# Patient Record
Sex: MALE | Race: WHITE | NOT HISPANIC OR LATINO | Employment: FULL TIME | URBAN - NONMETROPOLITAN AREA
[De-identification: names, ages, dates, MRNs, and addresses within clinical notes are randomized per-mention and may not be internally consistent; named-entity substitution may affect disease eponyms.]

---

## 2018-12-11 ENCOUNTER — OFFICE VISIT (OUTPATIENT)
Dept: ORTHOPEDIC SURGERY | Facility: CLINIC | Age: 58
End: 2018-12-11

## 2018-12-11 ENCOUNTER — HOSPITAL ENCOUNTER (OUTPATIENT)
Dept: GENERAL RADIOLOGY | Facility: HOSPITAL | Age: 58
Discharge: HOME | End: 2018-12-11
Admitting: ORTHOPAEDIC SURGERY

## 2018-12-11 ENCOUNTER — PREP FOR SURGERY (OUTPATIENT)
Dept: OTHER | Facility: HOSPITAL | Age: 58
End: 2018-12-11

## 2018-12-11 ENCOUNTER — APPOINTMENT (OUTPATIENT)
Dept: PREADMISSION TESTING | Facility: HOSPITAL | Age: 58
End: 2018-12-11

## 2018-12-11 VITALS — RESPIRATION RATE: 18 BRPM | BODY MASS INDEX: 26.7 KG/M2 | WEIGHT: 176.2 LBS | HEIGHT: 68 IN

## 2018-12-11 VITALS
SYSTOLIC BLOOD PRESSURE: 161 MMHG | HEIGHT: 68 IN | HEART RATE: 58 BPM | BODY MASS INDEX: 26.67 KG/M2 | WEIGHT: 176 LBS | DIASTOLIC BLOOD PRESSURE: 101 MMHG | OXYGEN SATURATION: 99 %

## 2018-12-11 DIAGNOSIS — Z01.818 PREOP EXAMINATION: Primary | ICD-10-CM

## 2018-12-11 DIAGNOSIS — S52.531A CLOSED COLLES' FRACTURE OF RIGHT RADIUS, INITIAL ENCOUNTER: ICD-10-CM

## 2018-12-11 DIAGNOSIS — M25.531 ARTHRALGIA OF RIGHT WRIST: Primary | ICD-10-CM

## 2018-12-11 DIAGNOSIS — M25.521 ARTHRALGIA OF RIGHT ELBOW: Primary | ICD-10-CM

## 2018-12-11 DIAGNOSIS — Z01.818 PREOP EXAMINATION: ICD-10-CM

## 2018-12-11 LAB
ALBUMIN SERPL-MCNC: 4.7 G/DL (ref 3.5–5)
ALBUMIN/GLOB SERPL: 1.4 G/DL (ref 1–2)
ALP SERPL-CCNC: 78 U/L (ref 38–126)
ALT SERPL W P-5'-P-CCNC: 31 U/L (ref 13–69)
ANION GAP SERPL CALCULATED.3IONS-SCNC: 11.9 MMOL/L (ref 10–20)
AST SERPL-CCNC: 32 U/L (ref 15–46)
BACTERIA UR QL AUTO: ABNORMAL /HPF
BASOPHILS # BLD AUTO: 0.02 10*3/MM3 (ref 0–0.2)
BASOPHILS NFR BLD AUTO: 0.2 % (ref 0–2.5)
BILIRUB SERPL-MCNC: 0.5 MG/DL (ref 0.2–1.3)
BILIRUB UR QL STRIP: NEGATIVE
BUN BLD-MCNC: 21 MG/DL (ref 7–20)
BUN/CREAT SERPL: 21 (ref 6.3–21.9)
CALCIUM SPEC-SCNC: 9.3 MG/DL (ref 8.4–10.2)
CHLORIDE SERPL-SCNC: 106 MMOL/L (ref 98–107)
CLARITY UR: CLEAR
CO2 SERPL-SCNC: 24 MMOL/L (ref 26–30)
COLOR UR: YELLOW
CREAT BLD-MCNC: 1 MG/DL (ref 0.6–1.3)
DEPRECATED RDW RBC AUTO: 39.4 FL (ref 37–54)
EOSINOPHIL # BLD AUTO: 0.06 10*3/MM3 (ref 0–0.7)
EOSINOPHIL NFR BLD AUTO: 0.6 % (ref 0–7)
ERYTHROCYTE [DISTWIDTH] IN BLOOD BY AUTOMATED COUNT: 11.8 % (ref 11.5–14.5)
GFR SERPL CREATININE-BSD FRML MDRD: 77 ML/MIN/1.73
GLOBULIN UR ELPH-MCNC: 3.3 GM/DL
GLUCOSE BLD-MCNC: 99 MG/DL (ref 74–98)
GLUCOSE UR STRIP-MCNC: NEGATIVE MG/DL
HCT VFR BLD AUTO: 43.1 % (ref 42–52)
HGB BLD-MCNC: 15 G/DL (ref 14–18)
HGB UR QL STRIP.AUTO: NEGATIVE
HYALINE CASTS UR QL AUTO: ABNORMAL /LPF
IMM GRANULOCYTES # BLD: 0.03 10*3/MM3 (ref 0–0.06)
IMM GRANULOCYTES NFR BLD: 0.3 % (ref 0–0.6)
KETONES UR QL STRIP: NEGATIVE
LEUKOCYTE ESTERASE UR QL STRIP.AUTO: NEGATIVE
LYMPHOCYTES # BLD AUTO: 2.17 10*3/MM3 (ref 0.6–3.4)
LYMPHOCYTES NFR BLD AUTO: 20.2 % (ref 10–50)
MCH RBC QN AUTO: 31.8 PG (ref 27–31)
MCHC RBC AUTO-ENTMCNC: 34.8 G/DL (ref 30–37)
MCV RBC AUTO: 91.5 FL (ref 80–94)
MONOCYTES # BLD AUTO: 0.86 10*3/MM3 (ref 0–0.9)
MONOCYTES NFR BLD AUTO: 8 % (ref 0–12)
MUCOUS THREADS URNS QL MICRO: ABNORMAL /HPF
NEUTROPHILS # BLD AUTO: 7.58 10*3/MM3 (ref 2–6.9)
NEUTROPHILS NFR BLD AUTO: 70.7 % (ref 37–80)
NITRITE UR QL STRIP: NEGATIVE
NRBC BLD MANUAL-RTO: 0 /100 WBC (ref 0–0)
PH UR STRIP.AUTO: 6 [PH] (ref 5–8)
PLATELET # BLD AUTO: 230 10*3/MM3 (ref 130–400)
PMV BLD AUTO: 10.2 FL (ref 6–12)
POTASSIUM BLD-SCNC: 3.9 MMOL/L (ref 3.5–5.1)
PROT SERPL-MCNC: 8 G/DL (ref 6.3–8.2)
PROT UR QL STRIP: ABNORMAL
RBC # BLD AUTO: 4.71 10*6/MM3 (ref 4.7–6.1)
RBC # UR: ABNORMAL /HPF
REF LAB TEST METHOD: ABNORMAL
SODIUM BLD-SCNC: 138 MMOL/L (ref 137–145)
SP GR UR STRIP: >=1.03 (ref 1–1.03)
SQUAMOUS #/AREA URNS HPF: ABNORMAL /HPF
UROBILINOGEN UR QL STRIP: ABNORMAL
WBC NRBC COR # BLD: 10.72 10*3/MM3 (ref 4.8–10.8)
WBC UR QL AUTO: ABNORMAL /HPF

## 2018-12-11 PROCEDURE — 29125 APPL SHORT ARM SPLINT STATIC: CPT | Performed by: ORTHOPAEDIC SURGERY

## 2018-12-11 PROCEDURE — 93005 ELECTROCARDIOGRAM TRACING: CPT | Performed by: ORTHOPAEDIC SURGERY

## 2018-12-11 PROCEDURE — 87081 CULTURE SCREEN ONLY: CPT | Performed by: ORTHOPAEDIC SURGERY

## 2018-12-11 PROCEDURE — 81001 URINALYSIS AUTO W/SCOPE: CPT | Performed by: ORTHOPAEDIC SURGERY

## 2018-12-11 PROCEDURE — 71046 X-RAY EXAM CHEST 2 VIEWS: CPT

## 2018-12-11 PROCEDURE — 85025 COMPLETE CBC W/AUTO DIFF WBC: CPT | Performed by: ORTHOPAEDIC SURGERY

## 2018-12-11 PROCEDURE — 99204 OFFICE O/P NEW MOD 45 MIN: CPT | Performed by: ORTHOPAEDIC SURGERY

## 2018-12-11 PROCEDURE — 80053 COMPREHEN METABOLIC PANEL: CPT | Performed by: ORTHOPAEDIC SURGERY

## 2018-12-11 PROCEDURE — 36415 COLL VENOUS BLD VENIPUNCTURE: CPT

## 2018-12-11 RX ORDER — HYDROCODONE BITARTRATE AND ACETAMINOPHEN 5; 325 MG/1; MG/1
1 TABLET ORAL EVERY 8 HOURS PRN
Qty: 30 TABLET | Refills: 0 | Status: SHIPPED | OUTPATIENT
Start: 2018-12-11 | End: 2018-12-12 | Stop reason: HOSPADM

## 2018-12-11 RX ORDER — CHOLECALCIFEROL (VITAMIN D3) 50 MCG
2000 TABLET ORAL DAILY
COMMUNITY

## 2018-12-11 RX ORDER — CEFAZOLIN SODIUM 2 G/50ML
2 SOLUTION INTRAVENOUS
Status: CANCELLED | OUTPATIENT
Start: 2018-12-12 | End: 2018-12-13

## 2018-12-11 NOTE — H&P (VIEW-ONLY)
Subjective   Patient ID: Nolberto Byers is a 58 y.o. male  Fracture and Injury of the Right Wrist (Patient here today at the request of [facility] for right wrist fracture. Patient states he was walking this morning and slipped on ice and fell on outstretched hand. )             History of Present Illness  Left-hand dominant  who fell on his outstretched right wrist on a slippery ground at work sustained an injury closed distal radial fracture was initially seen and evaluated in urgent care placed in a splint comes in today for evaluation.  Gives no history of prior wrist or hand problems he does complain of some swelling and soreness in the elbow no elbow x-rays were done after initial evaluation at the urgent care.    He denies neck pain loss of consciousness or other extremity injuries.          Review of Systems   Constitutional: Negative for fever.   HENT: Negative for voice change.    Eyes: Negative for visual disturbance.   Respiratory: Negative for shortness of breath.    Cardiovascular: Negative for chest pain.   Gastrointestinal: Negative for abdominal distention and abdominal pain.   Genitourinary: Negative for dysuria.   Musculoskeletal: Positive for arthralgias and joint swelling. Negative for gait problem.   Skin: Negative for rash.   Neurological: Negative for speech difficulty.   Hematological: Does not bruise/bleed easily.   Psychiatric/Behavioral: Negative for confusion.       No past medical history on file.     Past Surgical History:   Procedure Laterality Date   • LASIK         No family history on file.    Social History     Socioeconomic History   • Marital status: Unknown     Spouse name: Not on file   • Number of children: Not on file   • Years of education: Not on file   • Highest education level: Not on file   Social Needs   • Financial resource strain: Not on file   • Food insecurity - worry: Not on file   • Food insecurity - inability: Not on file   • Transportation needs -  "medical: Not on file   • Transportation needs - non-medical: Not on file   Occupational History   • Not on file   Tobacco Use   • Smoking status: Never Smoker   Substance and Sexual Activity   • Alcohol use: Not on file   • Drug use: Not on file   • Sexual activity: Not on file   Other Topics Concern   • Not on file   Social History Narrative   • Not on file       I have reviewed all of the above social hx, family hx, surgical hx, medications, allergies & ROS and confirm that it is accurate.    No Known Allergies      Current Outpatient Medications:   •  zolpidem (AMBIEN) 10 MG tablet, Take 10 mg by mouth At Night As Needed for Sleep., Disp: , Rfl:     Objective   Resp 18   Ht 172.7 cm (68\")   Wt 79.9 kg (176 lb 3.2 oz)   BMI 26.79 kg/m²    Physical Exam   Cardiovascular: Regular rhythm and normal heart sounds.   Pulmonary/Chest: Effort normal and breath sounds normal.   Musculoskeletal:        Right elbow: He exhibits swelling. He exhibits no effusion.        Right wrist: He exhibits tenderness, swelling and deformity.     Constitutional: Patient is oriented to person, place, and time. Patient appears well-developed and well-nourished.   HENT:Head: Normocephalic and atraumatic.   Eyes: EOM are normal. Pupils are equal, round, and reactive to light.   Neck: Normal range of motion. Neck supple.   Cardiovascular: Normal rate.    Pulmonary/Chest: Effort normal and breath sounds normal.   Abdominal: Soft.   Neurological: Patient is alert and oriented to person, place, and time.   Skin: Skin is warm and dry.   Psychiatric: Patient has a normal mood and affect.   Nursing note and vitals reviewed.       [unfilled]   Right elbow with slight tenderness of the proximal lateral aspect no ecchymosis no abrasions contusions, no radial head tenderness, there is pain with supination pronation at the proximal aspect of the radius, no tenderness olecranon no tenderness triceps tendon insertional area.  No medial lateral condylar " ecchymosis swelling or tenderness ulnar nerve nontender    Right wrist with dorsal deformity no open wounds sensation slightly diminished fingertips long ring and small slight tenderness ulnar styloid good pulse and good capillary refill slight ecchymosis seen on the dorsal and volar aspects distal radial region no proximal carpal tenderness or anatomic snuffbox tenderness.    Assessment/Plan   Review of Radiographic Studies:    Right elbow x-ray negative for acute fracture, right wrist positive for dorsally displaced impacted slight shortening distal radius extra-articular fracture      Application of short arm volar fiberglass splint  Date/Time: 12/11/2018 2:35 PM  Performed by: Rivera Garcia MD  Authorized by: Rivera Garcia MD   Consent: Verbal consent obtained.  Risks and benefits: risks, benefits and alternatives were discussed  Consent given by: patient  Patient identity confirmed: verbally with patient  Location details: right arm  Splint type: volar short arm  Supplies used: Ortho-Glass  Post-procedure: The splinted body part was neurovascularly unchanged following the procedure.  Patient tolerance: Patient tolerated the procedure well with no immediate complications           Nolberto was seen today for fracture and injury.    Diagnoses and all orders for this visit:    Arthralgia of right elbow  -     XR Elbow 3+ View Right    Closed Colles' fracture of right radius, initial encounter  -     XR Wrist 3+ View Right    Other orders  -     Splint Application       Orthopedic activities reviewed and patient expressed appreciation, Risk, benefits, and merits of treatment alternatives reviewed with the patient and questions answered and The nature of the proposed surgery reviewed with the patient including risks, benefits, rehabilitation, recovery timeframe, and outcome expectations      Recommendations/Plan:   Surgery: Right distal radius ORIF with volar plate or other procedures as indicated    Patient  agreeable to call or return sooner for any concerns.       I discussed with the patient the diagnosis, condition, natural history and treatment alternatives, both surgical and nonsurgical.  I reviewed the surgical procedural details using models, diagrams and reviewing x-ray findings.  I explained the nature of the operation, anesthesia methods and the postoperative recovery.  I explained risks and complications including but not limited to infection, bleeding, blood clotting, poor healing, chronic pain, stiffness, failure of the procedure, possible recurrence of the condition and anesthetic related risks.  The patient had opportunity to ask questions which were all answered to their satisfaction and decided to proceed with the plan for surgery.  I believe informed consent to proceed with the surgery was given verbally in my presence today.  The surgical consent form will be signed in the presence of the nursing staff prior to the surgery.      Impression:  Right nondominant hand distal radius impacted shortened dorsally displaced distal radial work-related fracture neurovascularly intact closed injury, right elbow strain     Plan:ORIF right distal radius fracture with volar plate or other procedures as indicated

## 2018-12-11 NOTE — PROGRESS NOTES
Subjective   Patient ID: Nolberto Byers is a 58 y.o. male  Fracture and Injury of the Right Wrist (Patient here today at the request of [facility] for right wrist fracture. Patient states he was walking this morning and slipped on ice and fell on outstretched hand. )             History of Present Illness  Left-hand dominant  who fell on his outstretched right wrist on a slippery ground at work sustained an injury closed distal radial fracture was initially seen and evaluated in urgent care placed in a splint comes in today for evaluation.  Gives no history of prior wrist or hand problems he does complain of some swelling and soreness in the elbow no elbow x-rays were done after initial evaluation at the urgent care.    He denies neck pain loss of consciousness or other extremity injuries.          Review of Systems   Constitutional: Negative for fever.   HENT: Negative for voice change.    Eyes: Negative for visual disturbance.   Respiratory: Negative for shortness of breath.    Cardiovascular: Negative for chest pain.   Gastrointestinal: Negative for abdominal distention and abdominal pain.   Genitourinary: Negative for dysuria.   Musculoskeletal: Positive for arthralgias and joint swelling. Negative for gait problem.   Skin: Negative for rash.   Neurological: Negative for speech difficulty.   Hematological: Does not bruise/bleed easily.   Psychiatric/Behavioral: Negative for confusion.       No past medical history on file.     Past Surgical History:   Procedure Laterality Date   • LASIK         No family history on file.    Social History     Socioeconomic History   • Marital status: Unknown     Spouse name: Not on file   • Number of children: Not on file   • Years of education: Not on file   • Highest education level: Not on file   Social Needs   • Financial resource strain: Not on file   • Food insecurity - worry: Not on file   • Food insecurity - inability: Not on file   • Transportation needs -  "medical: Not on file   • Transportation needs - non-medical: Not on file   Occupational History   • Not on file   Tobacco Use   • Smoking status: Never Smoker   Substance and Sexual Activity   • Alcohol use: Not on file   • Drug use: Not on file   • Sexual activity: Not on file   Other Topics Concern   • Not on file   Social History Narrative   • Not on file       I have reviewed all of the above social hx, family hx, surgical hx, medications, allergies & ROS and confirm that it is accurate.    No Known Allergies      Current Outpatient Medications:   •  zolpidem (AMBIEN) 10 MG tablet, Take 10 mg by mouth At Night As Needed for Sleep., Disp: , Rfl:     Objective   Resp 18   Ht 172.7 cm (68\")   Wt 79.9 kg (176 lb 3.2 oz)   BMI 26.79 kg/m²    Physical Exam   Cardiovascular: Regular rhythm and normal heart sounds.   Pulmonary/Chest: Effort normal and breath sounds normal.   Musculoskeletal:        Right elbow: He exhibits swelling. He exhibits no effusion.        Right wrist: He exhibits tenderness, swelling and deformity.     Constitutional: Patient is oriented to person, place, and time. Patient appears well-developed and well-nourished.   HENT:Head: Normocephalic and atraumatic.   Eyes: EOM are normal. Pupils are equal, round, and reactive to light.   Neck: Normal range of motion. Neck supple.   Cardiovascular: Normal rate.    Pulmonary/Chest: Effort normal and breath sounds normal.   Abdominal: Soft.   Neurological: Patient is alert and oriented to person, place, and time.   Skin: Skin is warm and dry.   Psychiatric: Patient has a normal mood and affect.   Nursing note and vitals reviewed.       [unfilled]   Right elbow with slight tenderness of the proximal lateral aspect no ecchymosis no abrasions contusions, no radial head tenderness, there is pain with supination pronation at the proximal aspect of the radius, no tenderness olecranon no tenderness triceps tendon insertional area.  No medial lateral condylar " ecchymosis swelling or tenderness ulnar nerve nontender    Right wrist with dorsal deformity no open wounds sensation slightly diminished fingertips long ring and small slight tenderness ulnar styloid good pulse and good capillary refill slight ecchymosis seen on the dorsal and volar aspects distal radial region no proximal carpal tenderness or anatomic snuffbox tenderness.    Assessment/Plan   Review of Radiographic Studies:    Right elbow x-ray negative for acute fracture, right wrist positive for dorsally displaced impacted slight shortening distal radius extra-articular fracture      Application of short arm volar fiberglass splint  Date/Time: 12/11/2018 2:35 PM  Performed by: Rivera Garcia MD  Authorized by: Rivera Garcia MD   Consent: Verbal consent obtained.  Risks and benefits: risks, benefits and alternatives were discussed  Consent given by: patient  Patient identity confirmed: verbally with patient  Location details: right arm  Splint type: volar short arm  Supplies used: Ortho-Glass  Post-procedure: The splinted body part was neurovascularly unchanged following the procedure.  Patient tolerance: Patient tolerated the procedure well with no immediate complications           Nolberto was seen today for fracture and injury.    Diagnoses and all orders for this visit:    Arthralgia of right elbow  -     XR Elbow 3+ View Right    Closed Colles' fracture of right radius, initial encounter  -     XR Wrist 3+ View Right    Other orders  -     Splint Application       Orthopedic activities reviewed and patient expressed appreciation, Risk, benefits, and merits of treatment alternatives reviewed with the patient and questions answered and The nature of the proposed surgery reviewed with the patient including risks, benefits, rehabilitation, recovery timeframe, and outcome expectations      Recommendations/Plan:   Surgery: Right distal radius ORIF with volar plate or other procedures as indicated    Patient  agreeable to call or return sooner for any concerns.       I discussed with the patient the diagnosis, condition, natural history and treatment alternatives, both surgical and nonsurgical.  I reviewed the surgical procedural details using models, diagrams and reviewing x-ray findings.  I explained the nature of the operation, anesthesia methods and the postoperative recovery.  I explained risks and complications including but not limited to infection, bleeding, blood clotting, poor healing, chronic pain, stiffness, failure of the procedure, possible recurrence of the condition and anesthetic related risks.  The patient had opportunity to ask questions which were all answered to their satisfaction and decided to proceed with the plan for surgery.  I believe informed consent to proceed with the surgery was given verbally in my presence today.  The surgical consent form will be signed in the presence of the nursing staff prior to the surgery.      Impression:  Right nondominant hand distal radius impacted shortened dorsally displaced distal radial work-related fracture neurovascularly intact closed injury, right elbow strain     Plan:ORIF right distal radius fracture with volar plate or other procedures as indicated

## 2018-12-11 NOTE — PAT
"CALLED JULI ROACH CRNA R/T BP IN PAT /101.  PER PT, STATES THAT HIS BP WAS \"AROUND 210/90\" AT THE [facility]RG[facility] TODAY.  PT C/O PAIN TO RIGHT WRIST AT A LEVEL OF 8/10.  PT STATES THAT HE CHECKS HIS BP DAILY AT WORK AND THAT IT USUALLY RUNS IN THE \"130'S/80'S RANGE\".  PT ENCOURAGED TO CONTINUE TO CHECK BP REGULARLY.  PT DENIES AND HEADACHE, DIZZINESS, FATIGUE.  PT TO HAVE RIGHT WRIST SURGERY WITH DR. ELLIOTT TOMORROW.  ASKED IF ANYTHING FURTHER NEEDED PRIOR TO PROCEDURE WITH DR. ELLIOTT.  CRNA STATED THAT IT SOUNDED LIKE THE PATIENT WAS IN PAIN, AND THAT NOTHING TO BE DONE RIGHT NOW.  WILL TREAT ANY ELEVATED BP PRN DOS.    "

## 2018-12-12 ENCOUNTER — HOSPITAL ENCOUNTER (OUTPATIENT)
Facility: HOSPITAL | Age: 58
Setting detail: HOSPITAL OUTPATIENT SURGERY
Discharge: HOME | End: 2018-12-12
Attending: ORTHOPAEDIC SURGERY | Admitting: ORTHOPAEDIC SURGERY

## 2018-12-12 ENCOUNTER — ANESTHESIA (OUTPATIENT)
Dept: PERIOP | Facility: HOSPITAL | Age: 58
End: 2018-12-12

## 2018-12-12 ENCOUNTER — APPOINTMENT (OUTPATIENT)
Dept: GENERAL RADIOLOGY | Facility: HOSPITAL | Age: 58
End: 2018-12-12
Attending: ORTHOPAEDIC SURGERY

## 2018-12-12 ENCOUNTER — ANESTHESIA EVENT (OUTPATIENT)
Dept: PERIOP | Facility: HOSPITAL | Age: 58
End: 2018-12-12

## 2018-12-12 VITALS
DIASTOLIC BLOOD PRESSURE: 81 MMHG | SYSTOLIC BLOOD PRESSURE: 132 MMHG | RESPIRATION RATE: 16 BRPM | TEMPERATURE: 97.5 F | OXYGEN SATURATION: 99 % | HEART RATE: 69 BPM

## 2018-12-12 DIAGNOSIS — Z01.818 PREOP EXAMINATION: ICD-10-CM

## 2018-12-12 PROCEDURE — 76000 FLUOROSCOPY <1 HR PHYS/QHP: CPT

## 2018-12-12 PROCEDURE — C1713 ANCHOR/SCREW BN/BN,TIS/BN: HCPCS | Performed by: ORTHOPAEDIC SURGERY

## 2018-12-12 PROCEDURE — 25010000002 LORAZEPAM PER 2 MG

## 2018-12-12 PROCEDURE — 25607 OPTX DST RD XARTC FX/EPI SEP: CPT | Performed by: ORTHOPAEDIC SURGERY

## 2018-12-12 PROCEDURE — 25010000002 HYDROMORPHONE 1 MG/ML SOLUTION

## 2018-12-12 PROCEDURE — 25010000002 FENTANYL CITRATE (PF) 100 MCG/2ML SOLUTION: Performed by: NURSE ANESTHETIST, CERTIFIED REGISTERED

## 2018-12-12 PROCEDURE — 25010000002 DEXAMETHASONE PER 1 MG: Performed by: NURSE ANESTHETIST, CERTIFIED REGISTERED

## 2018-12-12 PROCEDURE — 25010000002 DEXAMETHASONE SODIUM PHOSPHATE 10 MG/ML SOLUTION: Performed by: NURSE ANESTHETIST, CERTIFIED REGISTERED

## 2018-12-12 PROCEDURE — 25010000002 ROPIVACAINE PER 1 MG: Performed by: NURSE ANESTHETIST, CERTIFIED REGISTERED

## 2018-12-12 PROCEDURE — 25010000002 PROPOFOL 200 MG/20ML EMULSION: Performed by: NURSE ANESTHETIST, CERTIFIED REGISTERED

## 2018-12-12 PROCEDURE — 25010000002 ONDANSETRON PER 1 MG: Performed by: ORTHOPAEDIC SURGERY

## 2018-12-12 PROCEDURE — 25010000002 LORAZEPAM PER 2 MG: Performed by: NURSE ANESTHETIST, CERTIFIED REGISTERED

## 2018-12-12 DEVICE — IMPLANTABLE DEVICE: Type: IMPLANTABLE DEVICE | Site: WRIST | Status: FUNCTIONAL

## 2018-12-12 RX ORDER — DEXAMETHASONE SODIUM PHOSPHATE 4 MG/ML
INJECTION, SOLUTION INTRA-ARTICULAR; INTRALESIONAL; INTRAMUSCULAR; INTRAVENOUS; SOFT TISSUE AS NEEDED
Status: DISCONTINUED | OUTPATIENT
Start: 2018-12-12 | End: 2018-12-12 | Stop reason: SURG

## 2018-12-12 RX ORDER — DEXAMETHASONE SODIUM PHOSPHATE 10 MG/ML
INJECTION, SOLUTION INTRAMUSCULAR; INTRAVENOUS
Status: COMPLETED
Start: 2018-12-12 | End: 2018-12-12

## 2018-12-12 RX ORDER — ONDANSETRON 2 MG/ML
4 INJECTION INTRAMUSCULAR; INTRAVENOUS ONCE AS NEEDED
Status: COMPLETED | OUTPATIENT
Start: 2018-12-12 | End: 2018-12-12

## 2018-12-12 RX ORDER — ROPIVACAINE HYDROCHLORIDE 5 MG/ML
INJECTION, SOLUTION EPIDURAL; INFILTRATION; PERINEURAL AS NEEDED
Status: DISCONTINUED | OUTPATIENT
Start: 2018-12-12 | End: 2018-12-12 | Stop reason: SURG

## 2018-12-12 RX ORDER — DEXAMETHASONE SODIUM PHOSPHATE 10 MG/ML
INJECTION, SOLUTION INTRAMUSCULAR; INTRAVENOUS AS NEEDED
Status: DISCONTINUED | OUTPATIENT
Start: 2018-12-12 | End: 2018-12-12 | Stop reason: SURG

## 2018-12-12 RX ORDER — PROPOFOL 10 MG/ML
INJECTION, EMULSION INTRAVENOUS AS NEEDED
Status: DISCONTINUED | OUTPATIENT
Start: 2018-12-12 | End: 2018-12-12 | Stop reason: SURG

## 2018-12-12 RX ORDER — ONDANSETRON 4 MG/1
4 TABLET, FILM COATED ORAL ONCE AS NEEDED
Status: DISCONTINUED | OUTPATIENT
Start: 2018-12-12 | End: 2018-12-12 | Stop reason: HOSPADM

## 2018-12-12 RX ORDER — ROPIVACAINE HYDROCHLORIDE 5 MG/ML
INJECTION, SOLUTION EPIDURAL; INFILTRATION; PERINEURAL
Status: COMPLETED
Start: 2018-12-12 | End: 2018-12-12

## 2018-12-12 RX ORDER — FENTANYL CITRATE 50 UG/ML
INJECTION, SOLUTION INTRAMUSCULAR; INTRAVENOUS AS NEEDED
Status: DISCONTINUED | OUTPATIENT
Start: 2018-12-12 | End: 2018-12-12 | Stop reason: SURG

## 2018-12-12 RX ORDER — SODIUM CHLORIDE, SODIUM LACTATE, POTASSIUM CHLORIDE, CALCIUM CHLORIDE 600; 310; 30; 20 MG/100ML; MG/100ML; MG/100ML; MG/100ML
1000 INJECTION, SOLUTION INTRAVENOUS CONTINUOUS
Status: DISCONTINUED | OUTPATIENT
Start: 2018-12-12 | End: 2018-12-12 | Stop reason: HOSPADM

## 2018-12-12 RX ORDER — CEFAZOLIN SODIUM 2 G/50ML
2 SOLUTION INTRAVENOUS
Status: DISCONTINUED | OUTPATIENT
Start: 2018-12-12 | End: 2018-12-12 | Stop reason: HOSPADM

## 2018-12-12 RX ORDER — SODIUM CHLORIDE 0.9 % (FLUSH) 0.9 %
3 SYRINGE (ML) INJECTION AS NEEDED
Status: DISCONTINUED | OUTPATIENT
Start: 2018-12-12 | End: 2018-12-12 | Stop reason: HOSPADM

## 2018-12-12 RX ORDER — LORAZEPAM 2 MG/ML
1 INJECTION INTRAMUSCULAR
Status: COMPLETED | OUTPATIENT
Start: 2018-12-12 | End: 2018-12-12

## 2018-12-12 RX ORDER — LORAZEPAM 2 MG/ML
INJECTION INTRAMUSCULAR
Status: COMPLETED
Start: 2018-12-12 | End: 2018-12-12

## 2018-12-12 RX ORDER — HYDROCODONE BITARTRATE AND ACETAMINOPHEN 5; 325 MG/1; MG/1
1-2 TABLET ORAL EVERY 6 HOURS PRN
Qty: 20 TABLET | Refills: 0 | OUTPATIENT
Start: 2018-12-12 | End: 2018-12-16

## 2018-12-12 RX ADMIN — HYDROMORPHONE HYDROCHLORIDE 0.5 MG: 1 INJECTION, SOLUTION INTRAMUSCULAR; INTRAVENOUS; SUBCUTANEOUS at 14:38

## 2018-12-12 RX ADMIN — FENTANYL CITRATE 100 MCG: 50 INJECTION, SOLUTION INTRAMUSCULAR; INTRAVENOUS at 12:53

## 2018-12-12 RX ADMIN — SODIUM CHLORIDE, POTASSIUM CHLORIDE, SODIUM LACTATE AND CALCIUM CHLORIDE 1000 ML: 600; 310; 30; 20 INJECTION, SOLUTION INTRAVENOUS at 10:20

## 2018-12-12 RX ADMIN — LORAZEPAM 1 MG: 2 INJECTION INTRAMUSCULAR; INTRAVENOUS at 14:28

## 2018-12-12 RX ADMIN — ROPIVACAINE HYDROCHLORIDE 25 ML: 5 INJECTION, SOLUTION EPIDURAL; INFILTRATION; PERINEURAL at 13:00

## 2018-12-12 RX ADMIN — Medication 0.5 MG: at 14:22

## 2018-12-12 RX ADMIN — HYDROMORPHONE HYDROCHLORIDE 0.5 MG: 1 INJECTION, SOLUTION INTRAMUSCULAR; INTRAVENOUS; SUBCUTANEOUS at 15:22

## 2018-12-12 RX ADMIN — PROPOFOL 150 MG: 10 INJECTION, EMULSION INTRAVENOUS at 12:53

## 2018-12-12 RX ADMIN — HYDROMORPHONE HYDROCHLORIDE 0.5 MG: 1 INJECTION, SOLUTION INTRAMUSCULAR; INTRAVENOUS; SUBCUTANEOUS at 14:22

## 2018-12-12 RX ADMIN — DEXAMETHASONE SODIUM PHOSPHATE 4 MG: 4 INJECTION, SOLUTION INTRAMUSCULAR; INTRAVENOUS at 13:05

## 2018-12-12 RX ADMIN — LORAZEPAM 1 MG: 2 INJECTION INTRAMUSCULAR; INTRAVENOUS at 14:52

## 2018-12-12 RX ADMIN — Medication 0.5 MG: at 14:38

## 2018-12-12 RX ADMIN — Medication 0.5 MG: at 15:22

## 2018-12-12 RX ADMIN — DEXAMETHASONE SODIUM PHOSPHATE 10 MG: 10 INJECTION, SOLUTION INTRAMUSCULAR; INTRAVENOUS at 13:00

## 2018-12-12 RX ADMIN — ONDANSETRON 4 MG: 2 INJECTION INTRAMUSCULAR; INTRAVENOUS at 13:05

## 2018-12-12 RX ADMIN — FENTANYL CITRATE 50 MCG: 50 INJECTION, SOLUTION INTRAMUSCULAR; INTRAVENOUS at 13:45

## 2018-12-12 NOTE — ANESTHESIA POSTPROCEDURE EVALUATION
Patient: Nolberto Byers    Procedure Summary     Date:  12/12/18 Room / Location:  [facility] OR 1 /  [facility] OR    Anesthesia Start:  1250 Anesthesia Stop:  1415    Procedure:  RIGHT DISTAL RADIUS OPEN REDUCTION INTERNAL FIXATION WITH VOLAR PLATE (Right Hand) Diagnosis:       Preop examination      (Preop examination [Z01.818])    Surgeon:  Rivera Garcia MD Provider:  Caden Mccullough CRNA    Anesthesia Type:  general ASA Status:  2          Anesthesia Type: general  Last vitals  BP   148/89 (12/12/18 1024)   Temp   97.8 °F (36.6 °C) (12/12/18 1024)   Pulse   53 (12/12/18 1024)   Resp   16 (12/12/18 1024)     SpO2   98 % (12/12/18 1024)     Post Anesthesia Care and Evaluation    Patient location during evaluation: bedside  Patient participation: complete - patient participated  Level of consciousness: awake and alert  Pain score: 0  Pain management: adequate  Airway patency: patent  Anesthetic complications: No anesthetic complications  PONV Status: none  Cardiovascular status: acceptable  Respiratory status: acceptable  Hydration status: acceptable

## 2018-12-12 NOTE — ANESTHESIA PROCEDURE NOTES
Airway  Urgency: elective      General Information and Staff    Patient location during procedure: OR  CRNA: Kirby Easton CRNA    Indications and Patient Condition    Preoxygenated: yes      Final Airway Details  Final airway type: supraglottic airway      Successful airway: classic  Size 4    Number of attempts at approach: 1

## 2018-12-12 NOTE — OP NOTE
OPERATIVE REPORT      PATIENT NAME:  Nolberto Byers                            YOB: 1960       PREOP DIAGNOSIS:   Closed displaced Right distal radius extraarticular  fracture    POSTOP DIAGNOSIS:  Same.    PROCEDURE:    Open reduction and internal fixation of Right distal radiusextraarticular fracture.    SURGEON:     Rivera Garcia MD    OPERATIVE TEAM:   Circulator: Sadie Renae RN; Sigifredo Calderon RN  Radiology Technologist: Isabelle Cho  Scrub Person: Constantino Sifuentes; Deanne Treviño; Shai Escobar; Precious Guadalupe    ANESTHETIST:  HAL: Caden Mccullough CRNA    ANESTHESIA:  Choice    ESTIM BLOOD LOSS:   0 ml    FINDINGS:       distal radius fracture.    SPECIMENS:    None.    Tourniquet time  36min    IMPLANTS:     Synthes volar distal locking plate    DRAINS:     None.    COMPLICATIONS:    None.    DISPOSITION:    Stable to recovery.    INDICATIONS:    Displaced, unstable distal right extraarticular radial fracture.    NARRATIVE:    The patient sustained a traumatic injury to the arm and wrist with clinical exam and imaging, after appropriate immobilization, to reveal an unstable fracture of the distal radius.  The option of surgical reduction and stabilization for the unstable fracture or continued nonsurgical management was reviewed.  The risks, benefits and alternatives were discussed and informed consent for the procedure obtained. Risks discussed including but not limited to anesthesia, infection, neurovascular injury, fracture malunion, nonunion, hardware issues with loosening or the need for possible future surgery,  pain, post-traumatic arthritis, and persistent pain or limitations from the injury condition.  Goals include the potential for earlier pain relief, improved fracture position, alignment and healing potential, improved arm and hand mobility and function.  After appropriate discussion of the risks and complications with the patient and all questions  answered to satisfaction, verbal as well as written permission was given  to proceed with the surgery. The patient presents for planned surgery.    Patient was brought to the operating room administered a suitable level of anesthesia placed in the supine position given IV antibiotic prophylaxis and a time out was called to confirm the correct nature of the procedure.  Standard prep and drape of the extremity was carried out in usual manner.  Esmarch bandage was used for exsanguination and the upper arm tourniquet was inflated to 250 mmHg.  Using the flexor carpi radialis tendon exposure approach, with loupe magnification and bipolar electrocautery,  the flexor carpi radialis tendon sheath was incised longitudinally taking care to identify and protect superficial radial nerve and radial artery structures.  Pronator quadratus was dissected sharply using a knife off the radial origin reflected ulnarly exposing the fracture site.  Care was taken to minimize soft tissue dissection of fracture fragments and using a combination of ligamentotaxis and direct reduction of fracture fragments were reduced into anatomic position confirmed to be satisfactory with image intensification and the appropriate size volar plate was selected and held to the  distal radius using image intensification.  Distal locking screw placement was placed in standard fashion followed by bicortical proximal plate fixation in the usual manner, image intensifier was used to confirm satisfactory reduction was achieved and held with the plate and all screws of appropriate length none impinging on the distal radius articular surface.  There were no signs of carpal instability and this was confirmed by fluoroscopic dynamic exam of the wrist.  Standard irrigation of the wound was carried out followed by closure by interrupted approximation of the pronator quadratus to its origin on the radial border of the radius, this was followed by interrupted  subcutaneous 2-0 Vicryl suture and running nylon suture to reapproximate skin edges.  Dressings were applied the tourniquet was deflated and the patient returned to recovery room in stable condition with no complications.

## 2018-12-12 NOTE — ANESTHESIA PROCEDURE NOTES
Peripheral Block      Patient reassessed immediately prior to procedure    Patient location during procedure: OR  Start time: 12/12/2018 12:54 PM  Stop time: 12/12/2018 1:06 PM  Reason for block: at surgeon's request and post-op pain management  Performed by  CRNA: Kirby Easton CRNA  Preanesthetic Checklist  Completed: patient identified, site marked, surgical consent, pre-op evaluation, timeout performed, IV checked, risks and benefits discussed and monitors and equipment checked  Prep:  Pt Position: prone  Sterile barriers:cap, gloves, mask and sterile barriers  Prep: ChloraPrep and air dry 3 min by clock  Patient monitoring: blood pressure monitoring, continuous pulse oximetry and EKG  Procedure  Sedation:no  Performed under: general  Guidance:ultrasound guided and nerve stimulator  Images:still images not obtained (printer broken)  Loss of twitch: 1 mA  Laterality:right  Block Type:supraclavicular  Injection Technique:single-shot  Needle Type:echogenic  Needle Gauge:21 G  Resistance on Injection: none    Medications  Comment:ropivicaine 0.5% 25 ml and decadron mg    Post Assessment  Injection Assessment: negative aspiration for heme, no paresthesia on injection and incremental injection  Patient Tolerance:comfortable throughout block  Complications:no

## 2018-12-12 NOTE — ANESTHESIA PREPROCEDURE EVALUATION
Anesthesia Evaluation     Patient summary reviewed and Nursing notes reviewed   NPO Solid Status: > 8 hours  NPO Liquid Status: > 8 hours           Airway   Dental      Pulmonary    (+) a smoker Current, COPD,   Cardiovascular         Neuro/Psych  GI/Hepatic/Renal/Endo      Musculoskeletal     (+) arthralgias,   Abdominal    Substance History      OB/GYN          Other   (+) arthritis                     Anesthesia Plan    ASA 2     general   (Risks and benefits discussed including risk of aspiration, recall and dental damage. All patient questions answered. Will continue with POC.    Discussed scv and ifcv block risk and benefits for popc.)  intravenous induction   Anesthetic plan, all risks, benefits, and alternatives have been provided, discussed and informed consent has been obtained with: patient.

## 2018-12-13 NOTE — ANESTHESIA POSTPROCEDURE EVALUATION
Patient: Nolberto Byers    Procedure Summary     Date:  12/12/18 Room / Location:  [facility] OR 1 /  [facility] OR    Anesthesia Start:  1250 Anesthesia Stop:  1415    Procedure:  RIGHT DISTAL RADIUS OPEN REDUCTION INTERNAL FIXATION WITH VOLAR PLATE (Right Hand) Diagnosis:       Preop examination      (Preop examination [Z01.818])    Surgeon:  Rivera Garcia MD Provider:  Caden Mccullough CRNA    Anesthesia Type:  general ASA Status:  2          Anesthesia Type: general  Last vitals  BP   132/81 (12/12/18 1730)   Temp   97.5 °F (36.4 °C) (12/12/18 1640)   Pulse   69 (12/12/18 1730)   Resp   16 (12/12/18 1730)     SpO2   99 % (12/12/18 1730)     Anesthesia Post Evaluation

## 2018-12-14 ENCOUNTER — TELEPHONE (OUTPATIENT)
Dept: ORTHOPEDIC SURGERY | Facility: CLINIC | Age: 58
End: 2018-12-14

## 2018-12-14 LAB — MRSA SPEC QL CULT: NORMAL

## 2018-12-16 ENCOUNTER — HOSPITAL ENCOUNTER (EMERGENCY)
Facility: HOSPITAL | Age: 58
Discharge: HOME | End: 2018-12-16
Attending: STUDENT IN AN ORGANIZED HEALTH CARE EDUCATION/TRAINING PROGRAM | Admitting: STUDENT IN AN ORGANIZED HEALTH CARE EDUCATION/TRAINING PROGRAM

## 2018-12-16 VITALS
BODY MASS INDEX: 27.04 KG/M2 | HEIGHT: 68 IN | TEMPERATURE: 97.4 F | HEART RATE: 51 BPM | RESPIRATION RATE: 18 BRPM | OXYGEN SATURATION: 99 % | DIASTOLIC BLOOD PRESSURE: 97 MMHG | WEIGHT: 178.4 LBS | SYSTOLIC BLOOD PRESSURE: 185 MMHG

## 2018-12-16 DIAGNOSIS — Z48.89 ENCOUNTER FOR POSTOPERATIVE WOUND CARE: Primary | ICD-10-CM

## 2018-12-16 PROCEDURE — 99283 EMERGENCY DEPT VISIT LOW MDM: CPT

## 2018-12-16 RX ORDER — CEPHALEXIN 500 MG/1
500 CAPSULE ORAL 4 TIMES DAILY
Qty: 28 CAPSULE | Refills: 0 | Status: SHIPPED | OUTPATIENT
Start: 2018-12-16 | End: 2018-12-23

## 2018-12-16 RX ORDER — ONDANSETRON 4 MG/1
4 TABLET, ORALLY DISINTEGRATING ORAL ONCE
Status: COMPLETED | OUTPATIENT
Start: 2018-12-16 | End: 2018-12-16

## 2018-12-16 RX ORDER — HYDROCODONE BITARTRATE AND ACETAMINOPHEN 5; 325 MG/1; MG/1
1 TABLET ORAL ONCE
Status: COMPLETED | OUTPATIENT
Start: 2018-12-16 | End: 2018-12-16

## 2018-12-16 RX ORDER — HYDROCODONE BITARTRATE AND ACETAMINOPHEN 5; 325 MG/1; MG/1
1 TABLET ORAL EVERY 6 HOURS PRN
Qty: 10 TABLET | Refills: 0 | Status: SHIPPED | OUTPATIENT
Start: 2018-12-16 | End: 2018-12-17

## 2018-12-16 RX ORDER — ONDANSETRON 4 MG/1
4 TABLET, ORALLY DISINTEGRATING ORAL EVERY 8 HOURS PRN
Qty: 8 TABLET | Refills: 0 | Status: SHIPPED | OUTPATIENT
Start: 2018-12-16

## 2018-12-16 RX ORDER — CEPHALEXIN 250 MG/1
500 CAPSULE ORAL ONCE
Status: COMPLETED | OUTPATIENT
Start: 2018-12-16 | End: 2018-12-16

## 2018-12-16 RX ADMIN — HYDROCODONE BITARTRATE AND ACETAMINOPHEN 1 TABLET: 5; 325 TABLET ORAL at 19:07

## 2018-12-16 RX ADMIN — CEPHALEXIN 500 MG: 250 CAPSULE ORAL at 19:06

## 2018-12-16 RX ADMIN — ONDANSETRON 4 MG: 4 TABLET, ORALLY DISINTEGRATING ORAL at 19:10

## 2018-12-16 NOTE — ED PROVIDER NOTES
Subjective   The patient is here with complaint of soreness to the right wrist forearm area status post surgery this past Wednesday patient noted some blisters for couple days afterwards no new injuries no numbness tingling no fevers chills presents here for further evaluation        History provided by:  Patient      Review of Systems   Constitutional: Negative.    HENT: Negative.    Eyes: Negative.    Respiratory: Negative.    Gastrointestinal: Negative.    Musculoskeletal: Negative.    Skin: Positive for wound.   Neurological: Negative.    Psychiatric/Behavioral: Negative.    All other systems reviewed and are negative.      Past Medical History:   Diagnosis Date   • Wears glasses     FOR READING   • Wrist fracture     RIGHT       No Known Allergies    Past Surgical History:   Procedure Laterality Date   • COLONOSCOPY  2010   • LASIK     • WISDOM TOOTH EXTRACTION         History reviewed. No pertinent family history.    Social History     Socioeconomic History   • Marital status: Unknown     Spouse name: Not on file   • Number of children: Not on file   • Years of education: Not on file   • Highest education level: Not on file   Tobacco Use   • Smoking status: Current Every Day Smoker     Packs/day: 0.50     Years: 30.00     Pack years: 15.00     Types: Cigarettes   • Smokeless tobacco: Never Used   Substance and Sexual Activity   • Alcohol use: No     Frequency: Never   • Drug use: No   • Sexual activity: Defer           Objective   Physical Exam   Constitutional: He is oriented to person, place, and time. He appears well-developed and well-nourished. No distress.   Afebrile nontoxic no acute distress   HENT:   Head: Normocephalic and atraumatic.   Mouth/Throat: No oropharyngeal exudate.   Eyes: Conjunctivae are normal. Pupils are equal, round, and reactive to light.   Neck: Normal range of motion. Neck supple.   Cardiovascular: Normal rate, regular rhythm and intact distal pulses.   Pulmonary/Chest: Effort  normal and breath sounds normal.   Musculoskeletal: Normal range of motion. He exhibits no edema or deformity.   Neurological: He is alert and oriented to person, place, and time.   Skin: Skin is warm and dry. Capillary refill takes less than 2 seconds. Rash noted. He is not diaphoretic. No pallor.   No pus no redness to the wound site intact sutures there are some intact blisters noted adjacent to the wound site no streaking no erythema   Psychiatric: He has a normal mood and affect. His behavior is normal. Judgment and thought content normal.   Nursing note and vitals reviewed.      Procedures           ED Course  ED Course as of Dec 16 2106   Sun Dec 16, 2018   1857 Discussed case with Dr. Rivera Rubio will follow up with patient tomorrow in his office recommends starting on Keflex refill Lortab prescription for 2 days  [SC]   1902 I redressed  the patient's wound, used some non adhering dressing, Ace wraps and reapply the splint  [SC]      ED Course User Index  [SC] Andres Carolina PA-C                  MDM  Number of Diagnoses or Management Options     Amount and/or Complexity of Data Reviewed  Review and summarize past medical records: yes  Discuss the patient with other providers: yes    Risk of Complications, Morbidity, and/or Mortality  Presenting problems: low  Diagnostic procedures: low  Management options: low    Patient Progress  Patient progress: stable        Final diagnoses:   Encounter for postoperative wound care            Anders Carolina PA-C  12/16/18 2106

## 2018-12-17 ENCOUNTER — OFFICE VISIT (OUTPATIENT)
Dept: ORTHOPEDIC SURGERY | Facility: CLINIC | Age: 58
End: 2018-12-17

## 2018-12-17 VITALS — WEIGHT: 178 LBS | RESPIRATION RATE: 18 BRPM | HEIGHT: 68 IN | BODY MASS INDEX: 26.98 KG/M2

## 2018-12-17 DIAGNOSIS — S52.531D CLOSED COLLES' FRACTURE OF RIGHT RADIUS WITH ROUTINE HEALING, SUBSEQUENT ENCOUNTER: Primary | ICD-10-CM

## 2018-12-17 PROCEDURE — 99024 POSTOP FOLLOW-UP VISIT: CPT | Performed by: ORTHOPAEDIC SURGERY

## 2018-12-17 RX ORDER — LISINOPRIL 10 MG/1
TABLET ORAL
COMMUNITY
Start: 2018-10-29

## 2018-12-17 RX ORDER — HYDROCODONE BITARTRATE AND ACETAMINOPHEN 5; 325 MG/1; MG/1
1 TABLET ORAL EVERY 8 HOURS PRN
Qty: 30 TABLET | Refills: 0 | Status: SHIPPED | OUTPATIENT
Start: 2018-12-17 | End: 2018-12-19 | Stop reason: RX

## 2018-12-17 RX ORDER — INFLUENZA A VIRUS A/SINGAPORE/GP1908/2015 IVR-180 (H1N1) ANTIGEN (MDCK CELL DERIVED, PROPIOLACTONE INACTIVATED), INFLUENZA A VIRUS A/NORTH CAROLINA/04/2016 (H3N2) HEMAGGLUTININ ANTIGEN (MDCK CELL DERIVED, PROPIOLACTONE INACTIVATED), INFLUENZA B VIRUS B/IOWA/06/2017 HEMAGGLUTININ ANTIGEN (MDCK CELL DERIVED, PROPIOLACTONE INACTIVATED), INFLUENZA B VIRUS B/SINGAPORE/INFTT-16-0610/2016 HEMAGGLUTININ ANTIGEN (MDCK CELL DERIVED, PROPIOLACTONE INACTIVATED) 15; 15; 15; 15 UG/.5ML; UG/.5ML; UG/.5ML; UG/.5ML
INJECTION, SUSPENSION INTRAMUSCULAR
COMMUNITY
Start: 2018-10-24

## 2018-12-17 NOTE — ED NOTES
"Pt stated that he had an surgery by Dr. Garcia on Wednesday for fracture to right arm.  Says he began having \"blisters and rash\" under splint since Friday.  Assisted patient with redressing ace wrap to splint and sling placed to RUE.       Ольга Villegas, RN  12/16/18 1920    "

## 2018-12-17 NOTE — PROGRESS NOTES
Subjective   Patient ID: Nolberto Byers is a 58 y.o. male  Post-op of the Right Wrist (Patient is here today for a dressing change and states he has blister on his hand, he did go to [facility] ER yesterday due to the blisters that are on his hand. His pain level 7/10.)             History of Present Illness  He reports today after being seen yesterday in the ER where he was found have some blistering along his incisional area was given a prescription for Keflex but has not yet filled.  Complains of pain in the wrist only denies significant pain in the elbow shoulder area fever other illness.      Review of Systems   Constitutional: Negative for fever.   HENT: Negative for voice change.    Eyes: Negative for visual disturbance.   Respiratory: Negative for shortness of breath.    Cardiovascular: Negative for chest pain.   Gastrointestinal: Negative for abdominal distention and abdominal pain.   Genitourinary: Negative for dysuria.   Musculoskeletal: Positive for arthralgias. Negative for gait problem and joint swelling.   Skin: Negative for rash.   Neurological: Negative for speech difficulty.   Hematological: Does not bruise/bleed easily.   Psychiatric/Behavioral: Negative for confusion.       Past Medical History:   Diagnosis Date   • Wears glasses     FOR READING   • Wrist fracture     RIGHT        Past Surgical History:   Procedure Laterality Date   • COLONOSCOPY  2010   • LASIK     • WISDOM TOOTH EXTRACTION         History reviewed. No pertinent family history.    Social History     Socioeconomic History   • Marital status: Unknown     Spouse name: Not on file   • Number of children: Not on file   • Years of education: Not on file   • Highest education level: Not on file   Social Needs   • Financial resource strain: Not on file   • Food insecurity - worry: Not on file   • Food insecurity - inability: Not on file   • Transportation needs - medical: Not on file   • Transportation needs - non-medical: Not on file  "  Occupational History   • Not on file   Tobacco Use   • Smoking status: Current Every Day Smoker     Packs/day: 0.50     Years: 30.00     Pack years: 15.00     Types: Cigarettes   • Smokeless tobacco: Never Used   Substance and Sexual Activity   • Alcohol use: No     Frequency: Never   • Drug use: No   • Sexual activity: Defer   Other Topics Concern   • Not on file   Social History Narrative   • Not on file       I have reviewed all of the above social hx, family hx, surgical hx, medications, allergies & ROS and confirm that it is accurate.    No Known Allergies      Current Outpatient Medications:   •  cephalexin (KEFLEX) 500 MG capsule, Take 1 capsule by mouth 4 (Four) Times a Day for 7 days., Disp: 28 capsule, Rfl: 0  •  Cholecalciferol (VITAMIN D) 2000 units tablet, Take 2,000 Units by mouth Daily., Disp: , Rfl:   •  FLUCELVAX QUADRIVALENT 0.5 ML suspension prefilled syringe injection, , Disp: , Rfl:   •  HYDROcodone-acetaminophen (NORCO) 5-325 MG per tablet, Take 1 tablet by mouth Every 8 (Eight) Hours As Needed for Other (PRN PAIN)., Disp: 30 tablet, Rfl: 0  •  lisinopril (PRINIVIL,ZESTRIL) 10 MG tablet, , Disp: , Rfl:   •  ondansetron ODT (ZOFRAN-ODT) 4 MG disintegrating tablet, Take 1 tablet by mouth Every 8 (Eight) Hours As Needed for Nausea or Vomiting., Disp: 8 tablet, Rfl: 0  •  zolpidem (AMBIEN) 10 MG tablet, Take 10 mg by mouth At Night As Needed for Sleep., Disp: , Rfl:   No current facility-administered medications for this visit.     Objective   Resp 18   Ht 172.7 cm (68\")   Wt 80.7 kg (178 lb)   BMI 27.06 kg/m²    Physical Exam  Constitutional: Patient is oriented to person, place, and time. Patient appears well-developed and well-nourished.   HENT:Head: Normocephalic and atraumatic.   Eyes: EOM are normal. Pupils are equal, round, and reactive to light.   Neck: Normal range of motion. Neck supple.   Cardiovascular: Normal rate.    Pulmonary/Chest: Effort normal and breath sounds normal. "   Abdominal: Soft.   Neurological: Patient is alert and oriented to person, place, and time.   Skin: Skin is warm and dry.   Psychiatric: Patient has a normal mood and affect.   Nursing note and vitals reviewed.       [unfilled]   Right wrist was examined there are several superficial clear blistered areas adjacent to the incisional area no real and drainage no incisional involvement blistered area of skin appears to correspond to the area of local anesthetic infiltration with minimal redness.  The forearm and hand are without signs of compartment syndrome normal capillary refill the fingertips and minimal pain with passive range of motion of the fingers.    Assessment/Plan   Review of Radiographic Studies:    No new imaging done today.      Procedures     Nolberto was seen today for post-op.    Diagnoses and all orders for this visit:    Closed Colles' fracture of right radius with routine healing, subsequent encounter    Other orders  -     HYDROcodone-acetaminophen (NORCO) 5-325 MG per tablet; Take 1 tablet by mouth Every 8 (Eight) Hours As Needed for Other (PRN PAIN).       Work status form completed and provided to patient      Recommendations/Plan:   Work/Activity Status: No use of involved extremity    Patient agreeable to call or return sooner for any concerns.             Impression:  Status post ORIF right distal radial fracture with skin blisters consistent with fracture blisters  Plan:  Keflex refill of hydrocodone, recheck Friday for dressing change

## 2018-12-19 RX ORDER — HYDROCODONE BITARTRATE AND ACETAMINOPHEN 5; 325 MG/1; MG/1
1 TABLET ORAL EVERY 8 HOURS PRN
Qty: 21 TABLET | Refills: 0 | Status: SHIPPED | OUTPATIENT
Start: 2018-12-19

## 2018-12-19 NOTE — TELEPHONE ENCOUNTER
Patient called the office stating that he had a family emergency and he had to head out to [facility] early this morning for a family emergency. He stated that he had cancelled his appt for 12/20/18 but is keeping his appt for 12/28/18. The patient was informed that Dr Garcia wanted him to have his dressing changed while he was down there. He stated that he had a PCP in [place] that he is going to go to before the weekend to have a dressing change.      He also had a issue with his [place] Rx at the [facility] pharmacy in [place] and was unable to  Get it filled. He is requesting to see if a Rx could be sent or called in to a pharmacy in [facility].     [facility] at [id number] [place] [address], [place] [address]     He would like a call back with a status. If the controlled is unable to be transferred he wants to see if something else could be sent in for pain.

## 2018-12-21 ENCOUNTER — TELEPHONE (OUTPATIENT)
Dept: ORTHOPEDIC SURGERY | Facility: CLINIC | Age: 58
End: 2018-12-21

## 2018-12-21 ENCOUNTER — HOSPITAL ENCOUNTER (EMERGENCY)
Dept: HOSPITAL 97 - ER | Age: 58
Discharge: HOME | End: 2018-12-21
Payer: SELF-PAY

## 2018-12-21 DIAGNOSIS — S50.821A: Primary | ICD-10-CM

## 2018-12-21 DIAGNOSIS — Z72.0: ICD-10-CM

## 2018-12-21 PROCEDURE — 99283 EMERGENCY DEPT VISIT LOW MDM: CPT

## 2018-12-21 NOTE — ER
Nurse's Notes                                                                                     

 [facility]                                                                

Name: Malik Vidal Jr                                                                              

Age: 58 yrs                                                                                       

Sex: Male                                                                                         

: 1960                                                                                   

MRN: [id number]                                                                                   

Arrival Date: 2018                                                                          

Time: 13:36                                                                                       

Account#: [id number]                                                                            

Bed 24                                                                                            

Private MD:                                                                                       

Diagnosis: Blister (nonthermal) of forearm                                                        

                                                                                                  

Presentation:                                                                                     

                                                                                             

13:36 Presenting complaint: Patient states: i was injured at work (wrist injury) in [employer] [facility] [place]  

      and im here in [place], i didn't have the chance to fill my antibiotics Rx, nd i       

      only took a day of it, i lost my Rx, my doctor wanted me to have my dressing on my R        

      arm changed before he writes another Rx; denies fever and chills; pain is 8/10;.            

      Transition of care: patient was not received from another setting of care. Onset of         

      symptoms was 2018. Risk Assessment: Do you want to hurt yourself or            

      someone else? Patient reports no desire to harm self or others. Initial Sepsis Screen:      

      Does the patient meet any 2 criteria? No. Patient's initial sepsis screen is negative.      

      Does the patient have a suspected source of infection? No. Patient's initial sepsis         

      screen is negative. Care prior to arrival: None.                                            

13:36 Method Of Arrival: Ambulatory                                                             

13:36 Acuity: AB 4                                                                             

                                                                                                  

Triage Assessment:                                                                                

13:45 General: Appears in no apparent distress. uncomfortable, Behavior is calm, cooperative, hj  

      appropriate for age. Pain: Complains of pain in R wrist.                                    

                                                                                                  

Historical:                                                                                       

- Allergies:                                                                                      

13:45 No Known Allergies;                                                                       

- Home Meds:                                                                                      

13:45 None [Active];                                                                            

- PMHx:                                                                                           

13:45 None;                                                                                     

- PSHx:                                                                                           

13:45 wrist surgery;                                                                            

                                                                                                  

- Immunization history:: Adult Immunizations up to date.                                          

- Social history:: Smoking status: Patient uses tobacco products, Patient/guardian                

  denies using alcohol.                                                                           

- Ebola Screening: : Patient negative for fever greater than or equal to 101.5 degrees            

  Fahrenheit, and additional compatible Ebola Virus Disease symptoms Patient denies               

  exposure to infectious person Patient denies travel to an Ebola-affected area in the            

  21 days before illness onset.                                                                   

- Family history:: not pertinent.                                                                 

- Hospitalizations: : Patient was recently seen at.                                               

                                                                                                  

                                                                                                  

Screenin:45 Abuse screen: Denies threats or abuse. Denies injuries from another. Nutritional        hj  

      screening: No deficits noted. Tuberculosis screening: No symptoms or risk factors           

      identified. Fall Risk None identified.                                                      

                                                                                                  

Assessment:                                                                                       

14:09 General: Appears comfortable, well groomed, well developed, well nourished, Behavior is tl3 

      calm, cooperative, appropriate for age. Pain: Complains of pain in right arm. Neuro:        

      Level of Consciousness is awake, alert, obeys commands. Cardiovascular: No deficits         

      noted. Patient's skin is warm and dry. Respiratory: Airway is patent Respiratory effort     

      is even, unlabored, Respiratory pattern is regular, symmetrical. GI: No deficits noted.     

      No signs and/or symptoms were reported involving the gastrointestinal system. : No        

      deficits noted. No signs and/or symptoms were reported regarding the genitourinary          

      system. EENT: No deficits noted. No signs and/or symptoms were reported regarding the       

      EENT system. Derm: Wound noted right arm Wound is incision site without redness or          

      drainage, suture line well approximated. one blister noted at the distal end of the         

      incision, other blister sites have unroofed theemselves.                                    

                                                                                                  

Vital Signs:                                                                                      

13:45  / 84; Pulse 60; Resp 18; Temp 97.7(TE); Pulse Ox 99% on R/A; Weight 79.83 kg;    hj  

      Height 5 ft. 8 in. (172.72 cm); Pain 8/10;                                                  

13:45 Body Mass Index 26.76 (79.83 kg, 172.72 cm)                                               

                                                                                                  

ED Course:                                                                                        

13:36 Patient arrived in ED.                                                                  hj  

13:44 Triage completed.                                                                       hj  

13:45 Arm band placed on left wrist.                                                          hj  

13:45 Patient has correct armband on for positive identification. Bed in low position. Call     

      light in reach. Side rails up X 1.                                                          

13:48 Theodora Rico, RN is Primary Nurse.                                                     tl3 

13:48 Mu Ward MD is Attending Physician.                                                rn  

14:09 No provider procedures requiring assistance completed. Patient did not have IV access   tl3 

      during this emergency room visit. Orthoglass splint: Volar splint applied on right arm.     

                                                                                                  

Administered Medications:                                                                         

No medications were administered                                                                  

                                                                                                  

                                                                                                  

Outcome:                                                                                          

14:08 Discharge ordered by MD.                                                                rn  

14:20 Patient left the ED.                                                                    tl3 

14:20 Discharged to home ambulatory.                                                          tl3 

14:20 Condition: good                                                                             

14:20 Discharge instructions given to patient, Instructed on discharge instructions, follow       

      up and referral plans. wound care, Demonstrated understanding of instructions,              

      follow-up care, splint care, Prescriptions given X 1.                                       

                                                                                                  

Signatures:                                                                                       

Mu Ward MD MD rn Joaquin, Henry, RN RN hj Lowrey, Tammy, RN                       RN   tl3                                                  

                                                                                                  

Corrections: (The following items were deleted from the chart)                                    

13:47 13:45 Pulse 60bpm; Resp 18bpm; Pulse Ox 99% RA; Temp 97.7F Temporal; 79.83 kg; Height 5 hj  

      ft. 8 in.; BMI: 26.7; Pain 8/10; hj                                                         

                                                                                                  

**************************************************************************************************

## 2018-12-21 NOTE — EDPHYS
Physician Documentation                                                                           

 [facility]                                                                

Name: Malik Vidal Jr                                                                              

Age: 58 yrs                                                                                       

Sex: Male                                                                                         

: 1960                                                                                   

MRN: [id number]                                                                                   

Arrival Date: 2018                                                                          

Time: 13:36                                                                                       

Account#: [id number]                                                                            

Bed 24                                                                                            

Private MD:                                                                                       

ED Physician Mu Ward                                                                         

HPI:                                                                                              

                                                                                             

14:00 This 58 yrs old  Male presents to ER via Ambulatory with complaints of Wound    rn  

      Recheck.                                                                                    

14:00 Patient presents to ED for recheck of: surgical wound. The affected area is on the      rn  

      right arm. Progress: The patient reports decreased pain, redness, swelling. The patient     

      has not experienced similar symptoms in the past. Reports in [place] recently, fell,       

      had radial/ulna fracture, had surgery, reports developed blisters shortly after             

      procedure, given abx, had f/u appt yesterday but flew here instead so unable to be          

      seen, reports decreased swelling and blistering, no fever, no drainage, lost his abx,       

      when called his surgeon he recommended having someone eval wound before he prescribes       

      abx. Went to urgent care who told him they don't deal with workman's comp, so came          

      here. .                                                                                     

                                                                                                  

Historical:                                                                                       

- Allergies:                                                                                      

13:45 No Known Allergies;                                                                     hj  

- Home Meds:                                                                                      

13:45 None [Active];                                                                          hj  

- PMHx:                                                                                           

13:45 None;                                                                                   hj  

- PSHx:                                                                                           

13:45 wrist surgery;                                                                          hj  

                                                                                                  

- Immunization history:: Adult Immunizations up to date.                                          

- Social history:: Smoking status: Patient uses tobacco products, Patient/guardian                

  denies using alcohol.                                                                           

- Ebola Screening: : Patient negative for fever greater than or equal to 101.5 degrees            

  Fahrenheit, and additional compatible Ebola Virus Disease symptoms Patient denies               

  exposure to infectious person Patient denies travel to an Ebola-affected area in the            

  21 days before illness onset.                                                                   

- Family history:: not pertinent.                                                                 

- Hospitalizations: : Patient was recently seen at.                                               

                                                                                                  

                                                                                                  

ROS:                                                                                              

14:00 Constitutional: Negative for fever, chills, and weight loss, Skin: Negative for injury, rn  

      + rash and pain to right forearm Neuro: Negative for headache, weakness, numbness,          

      tingling, and seizure.                                                                      

                                                                                                  

Exam:                                                                                             

14:00 Constitutional:  This is a well developed, well nourished patient who is awake, alert,  rn  

      and in no acute distress. MS/ Extremity:  Pulses equal, no cyanosis.  Neurovascular         

      intact. + mild erythema around surgical wound, no drainage, majority of blisters have       

      unroofed and flattened, + 1 single blood blister, no drainage from wound, no purulence,     

      no fluctuance, no streaking.                                                                

                                                                                                  

Vital Signs:                                                                                      

13:45  / 84; Pulse 60; Resp 18; Temp 97.7(TE); Pulse Ox 99% on R/A; Weight 79.83 kg;    hj  

      Height 5 ft. 8 in. (172.72 cm); Pain 8/10;                                                  

13:45 Body Mass Index 26.76 (79.83 kg, 172.72 cm)                                               

                                                                                                  

MDM:                                                                                              

13:49 Patient medically screened.                                                             rn  

14:00 Differential diagnosis: wound infection, friction injury, compression injury, blood     rn  

      blisters, reaction to solution. Data reviewed: vital signs, nurses notes, and as a          

      result, I will discharge patient. Counseling: I had a detailed discussion with the          

      patient and/or guardian regarding: the historical points, exam findings, and any            

      diagnostic results supporting the discharge/admit diagnosis, the need for outpatient        

      follow up, to return to the emergency department if symptoms worsen or persist or if        

      there are any questions or concerns that arise at home. ED course: Wound looks better       

      than pictures shown shortly after blister formation, mild erythema, no purulence, will      

      refill his abx, and has f/u appt with his surgeon in 7 days in [place]. .                  

14:08 ED course: Splint present was not circumferential, no signs of compartment syndrome. .  rn  

                                                                                                  

Administered Medications:                                                                         

No medications were administered                                                                  

                                                                                                  

                                                                                                  

Disposition:                                                                                      

18 14:08 Discharged to Home. Impression: Blister (nonthermal) of forearm.                   

- Condition is Stable.                                                                            

- Discharge Instructions: Cast or Splint Care, Adult, Sutured Wound Care, Wound Care.             

- Prescriptions for Bactrim - 160 mg Oral Tablet - take 1 tablet by ORAL route              

  every 12 hours for 10 days; 20 tablet.                                                          

- Medication Reconciliation Form, Thank You Letter, Antibiotic Education, Prescription            

  Opioid Use form.                                                                                

- Follow up: Private Physician; When: 1 week; Reason: Wound Recheck, Recheck today's              

  complaints, Continuance of care, Re-evaluation by your physician.                               

- Problem is new.                                                                                 

- Symptoms have improved.                                                                         

                                                                                                  

                                                                                                  

                                                                                                  

Signatures:                                                                                       

Mu Ward MD MD rn Joaquin, Henry, RN RN hj Lowrey, Tammy, RN                       RN   tl3                                                  

                                                                                                  

Corrections: (The following items were deleted from the chart)                                    

14:20 14:08 2018 14:08 Discharged to Home. Impression: Blister (nonthermal) of forearm. tl3 

      Condition is Stable. Forms are Medication Reconciliation Form, Thank You Letter,            

      Antibiotic Education, Prescription Opioid Use. Follow up: Private Physician; When: 1        

      week; Reason: Wound Recheck, Recheck today's complaints, Continuance of care,               

      Re-evaluation by your physician. Problem is new. Symptoms have improved. rn                 

                                                                                                  

**************************************************************************************************

## 2018-12-21 NOTE — TELEPHONE ENCOUNTER
Patient called the office requesting to see if we could send in another Rx for the antibiotic that Dr Garcia prescribed. He stated that he has misplaced it. I also asked the patient if he was able to go to his PCP as advised before for a dressing change. The patient informed me that he had changed his dressings hisself.     He would like the antibiotic sent to the [facility] on file in [facility]. He would like a status to let him know if he is needing to pick them up.

## 2018-12-27 DIAGNOSIS — S52.531D CLOSED COLLES' FRACTURE OF RIGHT RADIUS WITH ROUTINE HEALING, SUBSEQUENT ENCOUNTER: Primary | ICD-10-CM

## 2018-12-28 ENCOUNTER — OFFICE VISIT (OUTPATIENT)
Dept: ORTHOPEDIC SURGERY | Facility: CLINIC | Age: 58
End: 2018-12-28

## 2018-12-28 VITALS — RESPIRATION RATE: 18 BRPM | BODY MASS INDEX: 25.76 KG/M2 | WEIGHT: 170 LBS | HEIGHT: 68 IN

## 2018-12-28 DIAGNOSIS — S52.531D CLOSED COLLES' FRACTURE OF RIGHT RADIUS WITH ROUTINE HEALING, SUBSEQUENT ENCOUNTER: Primary | ICD-10-CM

## 2018-12-28 PROCEDURE — 29075 APPL CST ELBW FNGR SHORT ARM: CPT | Performed by: ORTHOPAEDIC SURGERY

## 2018-12-28 PROCEDURE — 99024 POSTOP FOLLOW-UP VISIT: CPT | Performed by: ORTHOPAEDIC SURGERY

## 2018-12-28 RX ORDER — SULFAMETHOXAZOLE AND TRIMETHOPRIM 800; 160 MG/1; MG/1
TABLET ORAL
COMMUNITY
Start: 2018-12-22

## 2018-12-28 NOTE — PROGRESS NOTES
Subjective   Patient ID: Nolberto Byers is a 58 y.o. male  Post-op and Pain of the Right Wrist (Patient is here today for suture removal, he states his wound is looking better.)             History of Present Illness  He returns status post ORIF distal radial fracture with volar plating, after his surgery took a trip to [place] did not have his prescription for antibiotic with him had to stop head and emergency room for wound check had his wound checked dispensed Bactrim DS 1 by mouth twice a day which she is taking has had no fever and drainage from the wound does complain of some stiffness in the fingers the pain in the wrist is much improved.      Review of Systems   Constitutional: Negative for fever.   HENT: Negative for voice change.    Eyes: Negative for visual disturbance.   Respiratory: Negative for shortness of breath.    Cardiovascular: Negative for chest pain.   Gastrointestinal: Negative for abdominal distention and abdominal pain.   Genitourinary: Negative for dysuria.   Musculoskeletal: Positive for arthralgias. Negative for gait problem and joint swelling.   Skin: Negative for rash.   Neurological: Negative for speech difficulty.   Hematological: Does not bruise/bleed easily.   Psychiatric/Behavioral: Negative for confusion.       Past Medical History:   Diagnosis Date   • Wears glasses     FOR READING   • Wrist fracture     RIGHT        Past Surgical History:   Procedure Laterality Date   • COLONOSCOPY  2010   • LASIK     • ORIF WRIST FRACTURE Right 12/12/2018    Procedure: RIGHT DISTAL RADIUS OPEN REDUCTION INTERNAL FIXATION WITH VOLAR PLATE;  Surgeon: Rivera Garcia MD;  Location: [facility];  Service: Orthopedics   • WISDOM TOOTH EXTRACTION         History reviewed. No pertinent family history.    Social History     Socioeconomic History   • Marital status: Unknown     Spouse name: Not on file   • Number of children: Not on file   • Years of education: Not on file   • Highest education level: Not on  "file   Social Needs   • Financial resource strain: Not on file   • Food insecurity - worry: Not on file   • Food insecurity - inability: Not on file   • Transportation needs - medical: Not on file   • Transportation needs - non-medical: Not on file   Occupational History   • Not on file   Tobacco Use   • Smoking status: Current Every Day Smoker     Packs/day: 0.50     Years: 30.00     Pack years: 15.00     Types: Cigarettes   • Smokeless tobacco: Never Used   Substance and Sexual Activity   • Alcohol use: No     Frequency: Never   • Drug use: No   • Sexual activity: Defer   Other Topics Concern   • Not on file   Social History Narrative   • Not on file       I have reviewed all of the above social hx, family hx, surgical hx, medications, allergies & ROS and confirm that it is accurate.    No Known Allergies      Current Outpatient Medications:   •  Cholecalciferol (VITAMIN D) 2000 units tablet, Take 2,000 Units by mouth Daily., Disp: , Rfl:   •  FLUCELVAX QUADRIVALENT 0.5 ML suspension prefilled syringe injection, , Disp: , Rfl:   •  HYDROcodone-acetaminophen (NORCO) 5-325 MG per tablet, Take 1 tablet by mouth Every 8 (Eight) Hours As Needed for Other (PRN PAIN)., Disp: 21 tablet, Rfl: 0  •  lisinopril (PRINIVIL,ZESTRIL) 10 MG tablet, , Disp: , Rfl:   •  ondansetron ODT (ZOFRAN-ODT) 4 MG disintegrating tablet, Take 1 tablet by mouth Every 8 (Eight) Hours As Needed for Nausea or Vomiting., Disp: 8 tablet, Rfl: 0  •  sulfamethoxazole-trimethoprim (BACTRIM DS,SEPTRA DS) 800-160 MG per tablet, , Disp: , Rfl:   •  zolpidem (AMBIEN) 10 MG tablet, Take 10 mg by mouth At Night As Needed for Sleep., Disp: , Rfl:     Objective   Resp 18   Ht 172.7 cm (68\")   Wt 77.1 kg (170 lb)   BMI 25.85 kg/m²    Physical Exam  Constitutional: Patient is oriented to person, place, and time. Patient appears well-developed and well-nourished.   HENT:Head: Normocephalic and atraumatic.   Eyes: EOM are normal. Pupils are equal, round, and " reactive to light.   Neck: Normal range of motion. Neck supple.   Cardiovascular: Normal rate.    Pulmonary/Chest: Effort normal and breath sounds normal.   Abdominal: Soft.   Neurological: Patient is alert and oriented to person, place, and time.   Skin: Skin is warm and dry.   Psychiatric: Patient has a normal mood and affect.   Nursing note and vitals reviewed.       [unfilled]   Right hand with good capillary refill all fingers sensation intact,minimal pain with passive range of motion to the fingers, incisional area well approximated minimal erythema skin dry with some flaky areas but no sign of active cellulitis or drainage, forearm soft.    Assessment/Plan   Review of Radiographic Studies:    Radiographic images today of fracture I personally viewed and confirm satisfactory alignment.      Removal of sutures application Steri-Strips, application short arm fiberglass cast right arm  Date/Time: 12/28/2018 9:53 AM  Performed by: Rivera Garcia MD  Authorized by: Rivera Garcia MD   Consent: Verbal consent obtained.  Risks and benefits: risks, benefits and alternatives were discussed  Consent given by: patient  Patient understanding: patient states understanding of the procedure being performed  Patient identity confirmed: verbally with patient  Location details: right arm  Splint type: volar short arm  Supplies used: Ortho-Glass  Post-procedure: The splinted body part was neurovascularly unchanged following the procedure.  Patient tolerance: Patient tolerated the procedure well with no immediate complications           Nolberto was seen today for post-op and pain.    Diagnoses and all orders for this visit:    Closed Colles' fracture of right radius with routine healing, subsequent encounter    Other orders  -     Splint Application       Work status form completed and provided to patient      Recommendations/Plan:   Work/Activity Status: No use of involved extremity    Patient agreeable to call or return  sooner for any concerns.         Advised he finishes prescription for Bactrim DS for 2-3 more days call or return earlier for any increased pain swelling or discomfort with the current cast    Impression: Status post ORIF right distal radial fracture with volar plate   Plan:  Return in 2 weeks x-rays right wrist in current cast

## 2019-01-09 DIAGNOSIS — S52.531D CLOSED COLLES' FRACTURE OF RIGHT RADIUS WITH ROUTINE HEALING, SUBSEQUENT ENCOUNTER: Primary | ICD-10-CM

## 2019-01-10 ENCOUNTER — OFFICE VISIT (OUTPATIENT)
Dept: ORTHOPEDIC SURGERY | Facility: CLINIC | Age: 59
End: 2019-01-10

## 2019-01-10 VITALS — RESPIRATION RATE: 18 BRPM | HEIGHT: 68 IN | BODY MASS INDEX: 25.76 KG/M2 | WEIGHT: 170 LBS

## 2019-01-10 DIAGNOSIS — S52.531D CLOSED COLLES' FRACTURE OF RIGHT RADIUS WITH ROUTINE HEALING, SUBSEQUENT ENCOUNTER: Primary | ICD-10-CM

## 2019-01-10 PROCEDURE — 99024 POSTOP FOLLOW-UP VISIT: CPT | Performed by: ORTHOPAEDIC SURGERY

## 2019-01-10 NOTE — PROGRESS NOTES
Subjective   Patient ID: Nolberto Byers is a 58 y.o. male  Post-op and Pain of the Right Wrist (Patient is here today for a post operative visit, he states his arm is painful and the meds he was taking made him feel funny. )             History of Present Illness  Overall a cast fits well yes she trimmed himself around the thumb area no new falls or injuries has been taking pain medication      Review of Systems   Constitutional: Negative for fever.   HENT: Negative for voice change.    Eyes: Negative for visual disturbance.   Respiratory: Negative for shortness of breath.    Cardiovascular: Negative for chest pain.   Gastrointestinal: Negative for abdominal distention and abdominal pain.   Genitourinary: Negative for dysuria.   Musculoskeletal: Positive for arthralgias. Negative for gait problem and joint swelling.   Skin: Negative for rash.   Neurological: Negative for speech difficulty.   Hematological: Does not bruise/bleed easily.   Psychiatric/Behavioral: Negative for confusion.       Past Medical History:   Diagnosis Date   • Wears glasses     FOR READING   • Wrist fracture     RIGHT        Past Surgical History:   Procedure Laterality Date   • COLONOSCOPY  2010   • LASIK     • ORIF WRIST FRACTURE Right 12/12/2018    Procedure: RIGHT DISTAL RADIUS OPEN REDUCTION INTERNAL FIXATION WITH VOLAR PLATE;  Surgeon: Rivera Garcia MD;  Location: [facility];  Service: Orthopedics   • WISDOM TOOTH EXTRACTION         History reviewed. No pertinent family history.    Social History     Socioeconomic History   • Marital status: Unknown     Spouse name: Not on file   • Number of children: Not on file   • Years of education: Not on file   • Highest education level: Not on file   Social Needs   • Financial resource strain: Not on file   • Food insecurity - worry: Not on file   • Food insecurity - inability: Not on file   • Transportation needs - medical: Not on file   • Transportation needs - non-medical: Not on file  "  Occupational History   • Not on file   Tobacco Use   • Smoking status: Current Every Day Smoker     Packs/day: 0.50     Years: 30.00     Pack years: 15.00     Types: Cigarettes   • Smokeless tobacco: Never Used   Substance and Sexual Activity   • Alcohol use: No     Frequency: Never   • Drug use: No   • Sexual activity: Defer   Other Topics Concern   • Not on file   Social History Narrative   • Not on file       I have reviewed all of the above social hx, family hx, surgical hx, medications, allergies & ROS and confirm that it is accurate.    No Known Allergies      Current Outpatient Medications:   •  Cholecalciferol (VITAMIN D) 2000 units tablet, Take 2,000 Units by mouth Daily., Disp: , Rfl:   •  FLUCELVAX QUADRIVALENT 0.5 ML suspension prefilled syringe injection, , Disp: , Rfl:   •  HYDROcodone-acetaminophen (NORCO) 5-325 MG per tablet, Take 1 tablet by mouth Every 8 (Eight) Hours As Needed for Other (PRN PAIN)., Disp: 21 tablet, Rfl: 0  •  lisinopril (PRINIVIL,ZESTRIL) 10 MG tablet, , Disp: , Rfl:   •  ondansetron ODT (ZOFRAN-ODT) 4 MG disintegrating tablet, Take 1 tablet by mouth Every 8 (Eight) Hours As Needed for Nausea or Vomiting., Disp: 8 tablet, Rfl: 0  •  sulfamethoxazole-trimethoprim (BACTRIM DS,SEPTRA DS) 800-160 MG per tablet, , Disp: , Rfl:   •  zolpidem (AMBIEN) 10 MG tablet, Take 10 mg by mouth At Night As Needed for Sleep., Disp: , Rfl:     Objective   Resp 18   Ht 172.7 cm (68\")   Wt 77.1 kg (170 lb)   BMI 25.85 kg/m²    Physical Exam  Constitutional: Patient is oriented to person, place, and time. Patient appears well-developed and well-nourished.   HENT:Head: Normocephalic and atraumatic.   Eyes: EOM are normal. Pupils are equal, round, and reactive to light.   Neck: Normal range of motion. Neck supple.   Cardiovascular: Normal rate.    Pulmonary/Chest: Effort normal and breath sounds normal.   Abdominal: Soft.   Neurological: Patient is alert and oriented to person, place, and time. "   Skin: Skin is warm and dry.   Psychiatric: Patient has a normal mood and affect.   Nursing note and vitals reviewed.       [unfilled]   Right short arm cast fits him well the fingers neurovascularly intact    Assessment/Plan   Review of Radiographic Studies:    Radiographic images today of fracture I personally viewed and confirm satisfactory alignment.      Procedures     Nolberto was seen today for post-op and pain.    Diagnoses and all orders for this visit:    Closed Colles' fracture of right radius with routine healing, subsequent encounter       Orthopedic activities reviewed and patient expressed appreciation and Work status form completed and provided to patient      Recommendations/Plan:   Work/Activity Status: No use of involved extremity    Patient agreeable to call or return sooner for any concerns.             Impression:  Status post right distal radial ORIF with volar plate  Plan:  Return 2 weeks for cast removal x-rays right wrist out of cast

## 2019-01-23 DIAGNOSIS — S52.531D CLOSED COLLES' FRACTURE OF RIGHT RADIUS WITH ROUTINE HEALING, SUBSEQUENT ENCOUNTER: Primary | ICD-10-CM

## 2019-01-24 ENCOUNTER — OFFICE VISIT (OUTPATIENT)
Dept: ORTHOPEDIC SURGERY | Facility: CLINIC | Age: 59
End: 2019-01-24

## 2019-01-24 VITALS — RESPIRATION RATE: 18 BRPM | BODY MASS INDEX: 25.76 KG/M2 | WEIGHT: 170 LBS | HEIGHT: 68 IN

## 2019-01-24 DIAGNOSIS — S52.531D CLOSED COLLES' FRACTURE OF RIGHT RADIUS WITH ROUTINE HEALING, SUBSEQUENT ENCOUNTER: Primary | ICD-10-CM

## 2019-01-24 PROCEDURE — 99024 POSTOP FOLLOW-UP VISIT: CPT | Performed by: ORTHOPAEDIC SURGERY

## 2019-01-24 NOTE — PROGRESS NOTES
Subjective   Patient ID: Nolberto Byers is a 58 y.o. male  Follow-up and Pain of the Right Wrist (Patient is here today for cast removal. He states his pain level is 7+/10.)             History of Present Illness    His hand is feeling better he's very happy to have the cast removed today working with restrictions at work no new injuries    Review of Systems   Constitutional: Negative for fever.   HENT: Negative for voice change.    Eyes: Negative for visual disturbance.   Respiratory: Negative for shortness of breath.    Cardiovascular: Negative for chest pain.   Gastrointestinal: Negative for abdominal distention and abdominal pain.   Genitourinary: Negative for dysuria.   Musculoskeletal: Positive for arthralgias. Negative for gait problem and joint swelling.   Skin: Negative for rash.   Neurological: Negative for speech difficulty.   Hematological: Does not bruise/bleed easily.   Psychiatric/Behavioral: Negative for confusion.       Past Medical History:   Diagnosis Date   • Wears glasses     FOR READING   • Wrist fracture     RIGHT        Past Surgical History:   Procedure Laterality Date   • COLONOSCOPY  2010   • LASIK     • ORIF WRIST FRACTURE Right 12/12/2018    Procedure: RIGHT DISTAL RADIUS OPEN REDUCTION INTERNAL FIXATION WITH VOLAR PLATE;  Surgeon: Rivera Garcia MD;  Location: [facility];  Service: Orthopedics   • WISDOM TOOTH EXTRACTION         History reviewed. No pertinent family history.    Social History     Socioeconomic History   • Marital status: Unknown     Spouse name: Not on file   • Number of children: Not on file   • Years of education: Not on file   • Highest education level: Not on file   Social Needs   • Financial resource strain: Not on file   • Food insecurity - worry: Not on file   • Food insecurity - inability: Not on file   • Transportation needs - medical: Not on file   • Transportation needs - non-medical: Not on file   Occupational History   • Not on file   Tobacco Use   • Smoking  "status: Current Every Day Smoker     Packs/day: 0.50     Years: 30.00     Pack years: 15.00     Types: Cigarettes   • Smokeless tobacco: Never Used   Substance and Sexual Activity   • Alcohol use: No     Frequency: Never   • Drug use: No   • Sexual activity: Defer   Other Topics Concern   • Not on file   Social History Narrative   • Not on file       I have reviewed all of the above social hx, family hx, surgical hx, medications, allergies & ROS and confirm that it is accurate.    No Known Allergies      Current Outpatient Medications:   •  Cholecalciferol (VITAMIN D) 2000 units tablet, Take 2,000 Units by mouth Daily., Disp: , Rfl:   •  FLUCELVAX QUADRIVALENT 0.5 ML suspension prefilled syringe injection, , Disp: , Rfl:   •  HYDROcodone-acetaminophen (NORCO) 5-325 MG per tablet, Take 1 tablet by mouth Every 8 (Eight) Hours As Needed for Other (PRN PAIN)., Disp: 21 tablet, Rfl: 0  •  lisinopril (PRINIVIL,ZESTRIL) 10 MG tablet, , Disp: , Rfl:   •  ondansetron ODT (ZOFRAN-ODT) 4 MG disintegrating tablet, Take 1 tablet by mouth Every 8 (Eight) Hours As Needed for Nausea or Vomiting., Disp: 8 tablet, Rfl: 0  •  sulfamethoxazole-trimethoprim (BACTRIM DS,SEPTRA DS) 800-160 MG per tablet, , Disp: , Rfl:   •  zolpidem (AMBIEN) 10 MG tablet, Take 10 mg by mouth At Night As Needed for Sleep., Disp: , Rfl:     Objective   Resp 18   Ht 172.7 cm (68\")   Wt 77.1 kg (170 lb)   BMI 25.85 kg/m²    Physical Exam  Constitutional: Patient is oriented to person, place, and time. Patient appears well-developed and well-nourished.   HENT:Head: Normocephalic and atraumatic.   Eyes: EOM are normal. Pupils are equal, round, and reactive to light.   Neck: Normal range of motion. Neck supple.   Cardiovascular: Normal rate.    Pulmonary/Chest: Effort normal and breath sounds normal.   Abdominal: Soft.   Neurological: Patient is alert and oriented to person, place, and time.   Skin: Skin is warm and dry.   Psychiatric: Patient has a normal " mood and affect.   Nursing note and vitals reviewed.       [unfilled]   Right hand cast is removed incisional areas well approximated minimal tenderness no swelling EPL FPL and flexor extensor tendon function intact to the hand    Assessment/Plan   Review of Radiographic Studies:    Indication to evaluate fracture healing, and compared with prior imaging, shows interm fracture healing, callus formation and or periostitis in continued good position and alignment.      Procedures     Nolberto was seen today for follow-up and pain.    Diagnoses and all orders for this visit:    Closed Colles' fracture of right radius with routine healing, subsequent encounter       Physical therapy referral given, Use brace as instructed and Work status form completed and provided to patient      Recommendations/Plan:   Work/Activity Status: No use of involved extremity    Patient agreeable to call or return sooner for any concerns.             Impression:  Status post right distal radial ORIF for volar plating, healing distal ulnar styloid fracture  Plan:  Therapy referral mobilization strengthening as tolerated, use splint while at work for protection, follow-up with me in 6 weeks at that time discuss return to work activities--no x-rays necessary at that time

## (undated) DEVICE — SUT VIC 3/0 SH 27IN J416H

## (undated) DEVICE — PAD,ABDOMINAL,5"X9",STERILE,LF,1/PK: Brand: MEDLINE INDUSTRIES, INC.

## (undated) DEVICE — CLAVICLE STRAP: Brand: DEROYAL

## (undated) DEVICE — SUT ETHLN 3/0 FS1 663G

## (undated) DEVICE — PK EXTRM UPPR 20

## (undated) DEVICE — GLV SURG SENSICARE W/ALOE PF LF 8 STRL

## (undated) DEVICE — CAST PADDING 3"X4 YD KIT: Brand: CARDINAL HEALTH

## (undated) DEVICE — Device

## (undated) DEVICE — DISPOSABLE TOURNIQUET CUFF SINGLE BLADDER, SINGLE PORT AND QUICK CONNECT CONNECTOR: Brand: COLOR CUFF

## (undated) DEVICE — GLV SURG SENSICARE W/ALOE PF LF 7.5 STRL

## (undated) DEVICE — DRSNG WND GZ CURAD OIL EMULSION 3X3IN STRL

## (undated) DEVICE — BIT DRL QC DIA W/DEPTHMARK 1.8X110MM

## (undated) DEVICE — SINGLE PORT MANIFOLD: Brand: NEPTUNE 2

## (undated) DEVICE — BNDG ELAS ELITE V/CLOSE 4IN 5YD LF STRL

## (undated) DEVICE — FLEXIBLE YANKAUER,MEDIUM TIP, NO VACUUM CONTROL: Brand: ARGYLE

## (undated) DEVICE — BANDAGE,GAUZE,CONFORMING,4"X75",STRL,LF: Brand: MEDLINE

## (undated) DEVICE — SPNG GZ WOVN 4X4IN 12PLY 10/BX STRL

## (undated) DEVICE — NON-ADHERENT STRIPS,OIL EMULSION: Brand: CURITY

## (undated) DEVICE — SUT ETHLN 4/0 PS2 PLSTC 1667G

## (undated) DEVICE — BNDG ELAS MATRX V/CLS 4IN 5YD LF

## (undated) DEVICE — SUT VIC 3/0 SH CR8 18IN J864D